# Patient Record
Sex: MALE | Race: WHITE
[De-identification: names, ages, dates, MRNs, and addresses within clinical notes are randomized per-mention and may not be internally consistent; named-entity substitution may affect disease eponyms.]

---

## 2019-07-10 ENCOUNTER — HOSPITAL ENCOUNTER (OUTPATIENT)
Dept: HOSPITAL 39 - GMAM | Age: 74
End: 2019-07-10
Attending: FAMILY MEDICINE
Payer: COMMERCIAL

## 2019-07-10 DIAGNOSIS — J44.9: ICD-10-CM

## 2019-07-10 DIAGNOSIS — I50.32: ICD-10-CM

## 2019-07-10 DIAGNOSIS — R53.83: Primary | ICD-10-CM

## 2019-07-22 ENCOUNTER — HOSPITAL ENCOUNTER (OUTPATIENT)
Dept: HOSPITAL 39 - GRHH | Age: 74
End: 2019-07-22
Attending: FAMILY MEDICINE
Payer: COMMERCIAL

## 2019-07-22 DIAGNOSIS — I25.10: ICD-10-CM

## 2019-07-22 DIAGNOSIS — E11.65: ICD-10-CM

## 2019-07-22 DIAGNOSIS — I50.21: Primary | ICD-10-CM

## 2019-07-29 ENCOUNTER — HOSPITAL ENCOUNTER (INPATIENT)
Dept: HOSPITAL 39 - ER | Age: 74
LOS: 2 days | Discharge: HOME | DRG: 291 | End: 2019-07-31
Attending: NURSE PRACTITIONER | Admitting: NURSE PRACTITIONER
Payer: COMMERCIAL

## 2019-07-29 DIAGNOSIS — Z95.1: ICD-10-CM

## 2019-07-29 DIAGNOSIS — I27.20: ICD-10-CM

## 2019-07-29 DIAGNOSIS — J44.0: ICD-10-CM

## 2019-07-29 DIAGNOSIS — N18.3: ICD-10-CM

## 2019-07-29 DIAGNOSIS — I42.9: ICD-10-CM

## 2019-07-29 DIAGNOSIS — L97.909: ICD-10-CM

## 2019-07-29 DIAGNOSIS — I50.9: ICD-10-CM

## 2019-07-29 DIAGNOSIS — Z79.4: ICD-10-CM

## 2019-07-29 DIAGNOSIS — E11.621: ICD-10-CM

## 2019-07-29 DIAGNOSIS — Z79.899: ICD-10-CM

## 2019-07-29 DIAGNOSIS — Z90.49: ICD-10-CM

## 2019-07-29 DIAGNOSIS — K44.9: ICD-10-CM

## 2019-07-29 DIAGNOSIS — I35.0: ICD-10-CM

## 2019-07-29 DIAGNOSIS — N28.9: ICD-10-CM

## 2019-07-29 DIAGNOSIS — I13.0: Primary | ICD-10-CM

## 2019-07-29 DIAGNOSIS — J18.9: ICD-10-CM

## 2019-07-29 DIAGNOSIS — E11.22: ICD-10-CM

## 2019-07-29 DIAGNOSIS — E11.51: ICD-10-CM

## 2019-07-29 NOTE — RAD
EXAM:  XR Chest, 1 View



CLINICAL HISTORY:  74 years old and is Male; cough



TECHNIQUE:  Frontal view of the chest.



COMPARISON:  5/21/2019



FINDINGS:

  Limitations:  None.

  Lungs:  There is vascular congestion and bilateral dependent

airspace consolidation.

  Pleural space:  There are small bilateral pleural effusions.

      No pneumothorax.

  Heart:  Unremarkable.  No cardiomegaly.

  Mediastinum:  Unremarkable.

  Bones/joints:  Unremarkable.

  Tubes, lines and devices:  Cardiac pacing leads unchanged.



IMPRESSION:     

  Abnormalities identified strongly suspicious for CHF. 

Component of basilar pneumonia not excluded.



Electronically signed by:  Antonette Salgado MD  7/29/2019 5:44 PM

CDT Workstation: [id number]

## 2019-07-29 NOTE — HP
SUPERVISING PHYSICIAN:  Javier Colorado MD



CHIEF COMPLAINT:  Shortness of breath.



HISTORY OF PRESENT ILLNESS:  This 74-year-old male patient went to his doctor's 
office today for routine followup and followup on his labs.  The patient has a 
history of valvular heart disease with cardiomyopathy.  He has had an ejection 
fraction of 25% to 30% from echocardiogram that was done in June.  More 
recently, he had a chest x-ray which showed bilateral pleural effusions, right 
greater than left.  He actually had a thoracentesis.  When he went to his 
doctor's office today, he also had some shortness of breath.  He was sent to the
Emergency Room from his physician's office for repeat workup.  Here in the 
Emergency Room, he was noted to have some mild bilateral pleural effusions, but 
nothing to the degree that required the thoracentesis before.  However, he also 
had elevated white count at 12.9 and chemistry which showed elevated BUN and 
creatinine, but this was normal for him as well.  BNP was 2240.  His chest x-ray
also showed some diastolic congestion.  For these reasons, he was referred for 
admission.  At the time of examination, the patient is dyspneic, mildly.  His 
oxygen saturations are okay.  He is in good spirits and has no complaints at 
this time.



PAST MEDICAL HISTORY:  

1.  Aortic stenosis with cardiomegaly, ejection fraction of 25% to 30%.  RVSP is

     51 mmHg for moderate pulmonary hypertension.  Aortic valve shows severe

     stenosis.  

2.  Diabetes mellitus.

3.  Chronic obstructive pulmonary disease.  

4.  Previous GI bleed secondary to esophageal ulcer. 

5.  Hiatal hernia

6.  Chronic renal failure.

7.  Peripheral vascular disease.

8.  Hypertension.



PAST SURGICAL HISTORY:  

1.  Cholecystectomy.

2.  Coronary artery bypass graft.



MEDICATIONS:  

1.  Toujeo 28 units subcutaneously daily.

2.  Toprol 5 mg p.o. daily.

3.  Metoprolol 25 mg p.o. b.i.d.

4.  Zantac 150 mg p.o. daily.

5.  Atorvastatin 40 mg p.o. q.h.s.

6.  Furosemide 20 mg 3 tabs b.i.d.

7.  Protonix 40 mg p.o. b.i.d.

8.  Anoro Ellipta 1 inhalation daily.

9.  Proventil every 4 hours p.r.n.



ALLERGIES:  NO KNOWN DRUG ALLERGIES.



FAMILY HISTORY:  Noncontributory.



REVIEW OF SYSTEMS: 

CONSTITUTIONAL: No fever or chills.  No recent weight loss or weight gain.  

HEENT:  No headaches, vision changes, ear pain, nasal congestion or throat pain.
 

RESPIRATORY:  Positive for shortness of breath.  No cough, hemoptysis or 
pleuritic chest pain.

CARDIOVASCULAR:  No chest pain, palpitations or peripheral edema.

GASTROINTESTINAL:  No nausea, vomiting, diarrhea, constipation or abdominal 
pain.

GENITOURINARY:  No dysuria, frequency or flank pain.

HEMATOLOGIC:  Positive for easy bruising, but no transfusion reaction.  

ENDOCRINE:  No polydipsia, polyuria or polyphagia.  No heat or cold intolerance.

NEUROLOGIC:  No syncope, paresthesias or seizures.  

INTEGUMENTARY:  He does have a left diabetic foot ulcer, but no other rashes or 
lesions.



PHYSICAL EXAMINATION: 



VITAL SIGNS:  Blood pressure 122/76.  Heart rate 75.  Respiratory rate 20.  
Temperature 97.0.  Oxygen saturation 93%.



GENERAL:  Mr. Valerio is a 74-year-old male patient who is in mild respiratory 
distress. 



NEUROLOGIC: The patient is alert and oriented. 



CHEST:  Lungs diminished in the bases, but otherwise clear to auscultation 
bilaterally.



CARDIOVASCULAR:  Regular rate and rhythm with a III/VI systolic murmur noted at 
this time.  



ABDOMEN:  Soft.  Positive bowel sounds.  



EXTREMITIES:  Lower extremities show a left diabetic foot ulcer which is covered
with a dressing.  Pulses 2+.  Capillary refill is less than 2 seconds.  



LABORATORY: Labs and films are as discussed in history of present illness.



ASSESSMENT:

1.  Congestive heart failure exacerbation.

2.  Possible pneumonia.

3.  Acute on chronic kidney disease.

4.  Hypertension.

5.  Cardiomyopathy with ejection fraction 25-30% and moderate pulmonary 
hypertension

     along with valvular heart disease with severe aortic stenosis.

6.  Recent gastrointestinal bleed secondary to esophageal ulcer.  



PLAN:  At this time, the patient will be placed on scheduled diuretics.  We will
need to be cautious with these as they might worsen his renal function.  Given 
his elevated white count and bilateral patchy infiltrates, cannot rule out an 
underlying pneumonia.  I will go ahead and cover for pneumonia at this time.  We
will get serial daily labs to ensure that his BUN and creatinine do not become 
unmanageable.  The patient is supposed to have aortic valve surgery in the 
future, but they are waiting for his diabetic foot ulcer to improve.  I am going
to place him on DVT and GI ulcer prophylaxis as well.  Given his recent gastric 
hemorrhaging, I am going to hold off on Lovenox and utilize early ambulation.  I
will resume his home medications as well.  



#[id number]

[facility]

## 2019-07-29 NOTE — ED.PDOC
History of Present Illness





- General


Chief Complaint: General


Stated Complaint: SOB


Time Seen by Provider: 07/29/19 18:01


Source: patient


Exam Limitations: no limitations





- History of Present Illness


Initial Comments: 





Jatinder Valerio 73 y/o male stated thet he had been having exertional dyspnea for 

the last 3 days but no chest pains.Had history of pleural effusion 6 weeks ago 

and had thoracentesis done by Dr. Behr also scheduled for cardiac work up for 

heart valve surgery by his cardiologist but no definite date yet waiting for 

diabetic left foot ulcer to heal which was followed up by wound care specialist.


Timing/Duration: other - 3 days


Severity: moderate


Improving Factors: rest


Worsening Factors: movement


Associated Symptoms: shortness of breath, other - see hpi


Allergies/Adverse Reactions: 


Allergies





NO KNOWN ALLERGY Allergy (Verified 05/21/19 11:46)


   





Home Medications: 


Ambulatory Orders





Insulin Glargine [Toujeo Solostar] 28 unit SC BEDTIME 05/21/19 


Lisinopril 5 mg PO DAILY 05/21/19 


Metoprolol Tartrate 25 mg PO BID 05/21/19 


raNITIdine HCL [Zantac] 150 mg PO BEDTIME 05/21/19 











Review of Systems





- Review of Systems


Constitutional: States: no symptoms reported


EENTM: States: no symptoms reported


Respiratory: States: see HPI


Cardiology: States: see HPI


Gastrointestinal/Abdominal: States: no symptoms reported


Genitourinary: States: no symptoms reported


Musculoskeletal: States: no symptoms reported


Skin: States: no symptoms reported


Neurological: States: no symptoms reported


Endocrine: States: no symptoms reported


All other Systems: Reviewed and Negative, No Change from Baseline





Past Medical History (General)





- Patient Medical History


Hx Stroke: No


Hx Cardiac Disorders: Yes


Hx Congestive Heart Failure: No


Hx Hypertension: Yes


Hx Diabetes: Yes


Hx Gastroesophageal Reflux: Yes - bleeding


Hx Other PMH: Yes - PAD


Surgical History: cholecystectomy, coronary bypass surgery





- Vaccination History


Hx Influenza Vaccination: No


Hx Pneumococcal Vaccination: Yes





- Social History


Hx Tobacco Use: Yes - 2 ppd


Hx Physical Abuse: No


Hx Emotional Abuse: No





- Activities of Daily Living


Patient Lives Alone: No





Family Medical History





- Family History


  ** Father


Family History: Unknown


Living Status: Unknown


Hx Family Congestive Heart Failure: Yes - multiple family members





Physical Exam





- Physical Exam


General Appearance: Alert, Comfortable, No apparent distress


Eye Exam: bilateral normal


Ears, Nose, Throat: hearing grossly normal


Neck: supple, normal inspection


Respiratory: chest non-tender, no respiratory distress, decreased breath sounds


Cardiovascular/Chest: normal peripheral pulses, regular rate, rhythm, diastolic 

murmur - soft blowing murmur 2nd LICS


Peripheral Pulses: radial,right: 2+, radial,left: 2+, dorsalis pedis,right: 1+, 

dorsalis pedis,left: 1+


Gastrointestinal/Abdominal: soft, no organomegaly


Back Exam: no CVA tenderness, no vertebral tenderness


Extremity: pedal edema - 2+left >right


Neurologic: alert, oriented x 3


Skin Exam: normal color, warm/dry, other - diabetic foot ulcer





Progress





- Progress


Progress: 





07/29/19 19:13


                                        





07/29/19 17:28


B-TYPE NATRIURETIC PEPTIDE/BNP Stat 


CARDIAC ENZYME GROUP Stat 


MAGNESIUM Stat 


PARTIAL THROMBOPLASTIN TIME Stat 


PROTHROMBIN TIME Stat 


URINALYSIS Stat 





07/29/19 Dinner


1800 Calorie ADA Diet 








                         Laboratory Results - last 24 hr











  07/29/19 07/29/19 07/29/19





  16:15 17:28 17:40


 


WBC    12.9 H


 


RBC    4.09 L


 


Hgb    11.5 L


 


Hct    34.7 L


 


MCV    84.8


 


MCH    28.1


 


MCHC    33.1


 


RDW    14.9 H


 


Plt Count    356


 


MPV    9.3


 


Absolute Neuts (auto)    10.30 H


 


Absolute Lymphs (auto)    1.10


 


Absolute Monos (auto)    1.00 H


 


Absolute Eos (auto)    0.30


 


Absolute Basos (auto)    0.10


 


Neutrophils %    80.0 H


 


Lymphocytes %    8.5 L


 


Monocytes %    8.0


 


Eosinophils %    2.7


 


Basophils %    0.8


 


Sodium   Cancelled 


 


Potassium   Cancelled 


 


Chloride   Cancelled 


 


Carbon Dioxide   Cancelled 


 


Anion Gap   Cancelled 


 


BUN   Cancelled 


 


Creatinine   Cancelled 


 


BUN/Creatinine Ratio   Cancelled 


 


POC Glucose  58 L  


 


Random Glucose   Cancelled 


 


Serum Osmolality   Cancelled 


 


Calcium   Cancelled 


 


Total Bilirubin   


 


AST   


 


ALT   


 


Alkaline Phosphatase   


 


Serum Total Protein   


 


Albumin   


 


Globulin   


 


Albumin/Globulin Ratio   














  07/29/19





  17:40


 


WBC 


 


RBC 


 


Hgb 


 


Hct 


 


MCV 


 


MCH 


 


MCHC 


 


RDW 


 


Plt Count 


 


MPV 


 


Absolute Neuts (auto) 


 


Absolute Lymphs (auto) 


 


Absolute Monos (auto) 


 


Absolute Eos (auto) 


 


Absolute Basos (auto) 


 


Neutrophils % 


 


Lymphocytes % 


 


Monocytes % 


 


Eosinophils % 


 


Basophils % 


 


Sodium  134 L


 


Potassium  3.6


 


Chloride  94 L


 


Carbon Dioxide  25


 


Anion Gap  18.6 H


 


BUN  54 H


 


Creatinine  2.23 H


 


BUN/Creatinine Ratio  24.2 H


 


POC Glucose 


 


Random Glucose  123 H


 


Serum Osmolality  284.4


 


Calcium  9.1


 


Total Bilirubin  0.9


 


AST  25


 


ALT  18


 


Alkaline Phosphatase  100


 


Serum Total Protein  9.1 H


 


Albumin  3.9


 


Globulin  5.2 H


 


Albumin/Globulin Ratio  0.8 L











07/29/19 19:13


                               Vital Signs - 8 hr











  07/29/19 07/29/19 07/29/19





  15:55 16:30 18:40


 


Temperature 97.0 F L  


 


Pulse Rate [ 81 84 68





Left Radial]   


 


Respiratory 20 18 18





Rate   


 


Blood Pressure 99/61 96/55 126/79





[Left Arm]   


 


O2 Sat by Pulse 92 L 94 L 91 L





Oximetry   














Departure





- Departure


Clinical Impression: 


 Valvular heart disease, Diabetes 1.5, managed as type 1, CKD (chronic kidney 

disease) stage 3, GFR 30-59 ml/min, Foot ulcer due to secondary DM





Acute exacerbation of CHF (congestive heart failure)


Qualifiers:


 Heart failure type: unspecified Qualified Code(s): I50.9 - Heart failure, 

unspecified





Time of Disposition: 19:55


Disposition: Admit Patient


Condition: Fair


Departure Forms:  Patient Portal Self Enrollment


Referrals: 


Javier Colorado MD [Primary Care Provider] - 1-2 Weeks


Home Medications: 


Ambulatory Orders





Insulin Glargine [Toujeo Solostar] 28 unit SC BEDTIME 05/21/19 


Lisinopril 5 mg PO DAILY 05/21/19 


Metoprolol Tartrate 25 mg PO BID 05/21/19 


raNITIdine HCL [Zantac] 150 mg PO BEDTIME 05/21/19 











Decision To Admit





- Decistion To Admit


Decision to Admit Reason: Admit from ER


Decision to Admit Date: 07/29/19 - D/W Zeyad García-ANP/Hospitalist


Decision to Admit Time: 19:53

## 2019-07-30 RX ADMIN — IPRATROPIUM BROMIDE AND ALBUTEROL SULFATE SCH ML: .5; 3 SOLUTION RESPIRATORY (INHALATION) at 17:37

## 2019-07-30 RX ADMIN — SODIUM CHLORIDE SCH MG: 9 INJECTION, SOLUTION INTRAVENOUS at 08:54

## 2019-07-30 RX ADMIN — ASPIRIN SCH MG: 81 TABLET, COATED ORAL at 08:55

## 2019-07-30 RX ADMIN — FERROUS GLUCONATE TAB 324 MG (37.5 MG ELEMENTAL IRON) SCH MG: 324 (37.5 FE) TAB at 09:01

## 2019-07-30 RX ADMIN — INSULIN LISPRO SCH UNITS: 100 INJECTION, SOLUTION INTRAVENOUS; SUBCUTANEOUS at 07:20

## 2019-07-30 RX ADMIN — PANTOPRAZOLE SODIUM SCH MG: 40 TABLET, DELAYED RELEASE ORAL at 09:48

## 2019-07-30 RX ADMIN — IPRATROPIUM BROMIDE AND ALBUTEROL SULFATE SCH ML: .5; 3 SOLUTION RESPIRATORY (INHALATION) at 08:35

## 2019-07-30 RX ADMIN — INSULIN LISPRO SCH UNITS: 100 INJECTION, SOLUTION INTRAVENOUS; SUBCUTANEOUS at 11:54

## 2019-07-30 RX ADMIN — PANTOPRAZOLE SODIUM SCH MG: 40 TABLET, DELAYED RELEASE ORAL at 17:06

## 2019-07-30 RX ADMIN — IPRATROPIUM BROMIDE AND ALBUTEROL SULFATE SCH ML: .5; 3 SOLUTION RESPIRATORY (INHALATION) at 19:36

## 2019-07-30 RX ADMIN — INSULIN LISPRO SCH UNITS: 100 INJECTION, SOLUTION INTRAVENOUS; SUBCUTANEOUS at 07:21

## 2019-07-30 RX ADMIN — INSULIN LISPRO SCH UNITS: 100 INJECTION, SOLUTION INTRAVENOUS; SUBCUTANEOUS at 11:52

## 2019-07-30 RX ADMIN — INSULIN LISPRO SCH UNITS: 100 INJECTION, SOLUTION INTRAVENOUS; SUBCUTANEOUS at 17:05

## 2019-07-30 RX ADMIN — SODIUM CHLORIDE SCH MG: 9 INJECTION, SOLUTION INTRAVENOUS at 17:06

## 2019-07-30 RX ADMIN — INSULIN LISPRO SCH UNITS: 100 INJECTION, SOLUTION INTRAVENOUS; SUBCUTANEOUS at 21:03

## 2019-07-30 RX ADMIN — IPRATROPIUM BROMIDE AND ALBUTEROL SULFATE SCH ML: .5; 3 SOLUTION RESPIRATORY (INHALATION) at 13:00

## 2019-07-30 NOTE — RAD
EXAM:

  XR Chest, 1 View



CLINICAL HISTORY:

  The patient is 74 years old and is Male; CHF



TECHNIQUE:

  Frontal view of the chest.



COMPARISON:

  Chest radiograph from 07/29/2019



FINDINGS:

  LUNGS:  Persistent mild bibasilar opacities, right more than

left, suggesting atelectasis and/or pneumonia. This is not

significantly changed.

  PLEURAL SPACE:  Probable small bilateral pleural effusions

again noted.  No obvious pneumothorax.  

  HEART:  Stable mild enlargement of the cardiac silhouette.

  MEDIASTINUM:  Unremarkable.

  BONES/JOINTS:  The bones are unchanged.



IMPRESSION:     

  No significant interval change visualized. Bilateral lower lung

opacities again demonstrated, more prominent on the right.

Findings suggest pleural effusions with underlying atelectasis

and/or pneumonia.



Electronically signed by:  Mere Rawls MD  7/30/2019 7:26 AM CDT

Workstation: [id number]

## 2019-07-30 NOTE — PN
SUPERVISING PHYSICIAN:  Javier Colorado MD



DATE:  07/30/19



SUBJECTIVE:  The patient states he feels like he is breathing better than he was
yesterday.  He states he was actually able to sleep last night.  He states he 
has not been able to sleep very well for the past couple of weeks.  



OBJECTIVE:  

VITAL SIGNS:  Blood pressure 120/74.  Heart rate 90.  Respiratory rate 18.  
Temperature 97.8.  Oxygen saturation 96%.

GENERAL:  Mr. Valerio is a 74-year-old male patient in no active distress 
currently.

NEUROLOGIC:  Alert and oriented.

LUNGS:  No rales any longer.  

CARDIOVASCULAR:  Regular rate and rhythm.  Normal S1, S2.  He does have a 
systolic murmur, which is III/VI.

ABDOMEN:  Soft.  Positive bowel sounds.  

GENITOURINARY:  Deferred.

EXTREMITIES: Lower extremities with no significant edema.  



LABORATORY:  White count had normalized at 8.4, hemoglobin 10.1.  Chemistry with
improvement in BUN and creatinine to 53 and 2.05.  



RADIOLOGY:  Chest x-ray done does not show any significant change.



ASSESSMENT: 

1.  Congestive heart failure exacerbation.

2.  Possible pneumonia.

3.  Acute on chronic kidney disease.

4.  Hypertension.

5.  Cardiomyopathy with ejection fraction 25-30% with moderate pulmonary 
hypertension

     along with severe aortic stenosis.

6.  History of gastrointestinal bleed secondary to esophageal ulcer.  



PLAN:  The patient has had symptomatic improvement with IV Lasix.  We will 
continue this for the rest of today and evaluate for discharge first thing in 
the morning.



#[id number]

MTDD

## 2019-07-31 VITALS — SYSTOLIC BLOOD PRESSURE: 106 MMHG | DIASTOLIC BLOOD PRESSURE: 62 MMHG | TEMPERATURE: 98 F | OXYGEN SATURATION: 95 %

## 2019-07-31 RX ADMIN — INSULIN LISPRO SCH UNITS: 100 INJECTION, SOLUTION INTRAVENOUS; SUBCUTANEOUS at 11:31

## 2019-07-31 RX ADMIN — SODIUM CHLORIDE SCH MG: 9 INJECTION, SOLUTION INTRAVENOUS at 09:23

## 2019-07-31 RX ADMIN — INSULIN LISPRO SCH: 100 INJECTION, SOLUTION INTRAVENOUS; SUBCUTANEOUS at 07:08

## 2019-07-31 RX ADMIN — IPRATROPIUM BROMIDE AND ALBUTEROL SULFATE PRN ML: .5; 3 SOLUTION RESPIRATORY (INHALATION) at 03:50

## 2019-07-31 RX ADMIN — ASPIRIN SCH MG: 81 TABLET, COATED ORAL at 09:23

## 2019-07-31 RX ADMIN — PANTOPRAZOLE SODIUM SCH MG: 40 TABLET, DELAYED RELEASE ORAL at 06:05

## 2019-07-31 RX ADMIN — IPRATROPIUM BROMIDE AND ALBUTEROL SULFATE PRN ML: .5; 3 SOLUTION RESPIRATORY (INHALATION) at 08:08

## 2019-07-31 RX ADMIN — FERROUS GLUCONATE TAB 324 MG (37.5 MG ELEMENTAL IRON) SCH MG: 324 (37.5 FE) TAB at 09:23

## 2019-07-31 RX ADMIN — INSULIN LISPRO SCH UNITS: 100 INJECTION, SOLUTION INTRAVENOUS; SUBCUTANEOUS at 07:35

## 2019-07-31 RX ADMIN — INSULIN LISPRO SCH UNITS: 100 INJECTION, SOLUTION INTRAVENOUS; SUBCUTANEOUS at 11:30

## 2019-07-31 NOTE — RAD
EXAM DESCRIPTION: 



Chest,1 View



CLINICAL HISTORY: 



Congestive heart failure 



FINDINGS/ IMPRESSION: 



Comparison 7/30/2019.



Cardiomegaly, increased from previous study. Vascular congestion

with perihilar interstitial edema. Bilateral pleural effusions,

moderate on the right and small on the left. Opacity retrocardiac

may be atelectasis or consolidation



Electronically signed by:  Javier Golden MD  7/31/2019 7:17 AM

CDT Workstation: [id number]

## 2019-07-31 NOTE — DS
SUPERVISING PHYSICIAN:  Javier Colorado MD



DISCHARGE DIAGNOSIS: 

1.  Congestive heart failure with exacerbation.

2.  Possible pneumonia.

3.  Acute on chronic kidney disease.

4.  Hypertension.

5.  Cardiomyopathy with ejection fraction 25-30% with moderate pulmonary 
hypertension

     along with severe aortic stenosis.

6.  History of gastrointestinal bleed secondary to an esophageal ulcer.  



HISTORY OF PRESENT ILLNESS:  This is a 74-year-old male patient who was in his 
doctor's office on the day of admission to followup on his labs.  The patient 
has a history of valvular heart disease with cardiomyopathy.  He has had an 
ejection fraction of 25% to 30% from his echocardiogram that was done in June.  
He had a chest x-ray which showed bilateral pleural effusions, right greater 
than left and actually had a thoracentesis.  On followup, he had some shortness 
of breath.  He was sent to the Emergency Room from his physician's office for 
repeat workup.  Again, he has mild bilateral pleural effusions, but nothing to 
the degree that would require thoracentesis.  He did have an elevated white 
count at 12.9 and chemistry which showed elevated BUN and creatinine.  BNP was 
2240.  His chest x-ray also showed some diastolic congestion.  For these 
reasons, he was referred for admission.  On admission, his vital signs showed 
temperature 97, heart rate 81, blood pressure 99/61, respiratory rate 20 O2 
saturation 92% on room air.  



HOSPITAL COURSE:  Over the next 24 to 48 hours, he was given IV diuretics as 
well as judicious fluids.  He was also started on renal dosed Levaquin.  His 
labs stabilized.  The patient is to have aortic valve surgery in the future, but
they are waiting for his diabetic foot ulcer to improve.  He did have some 
shortness of breath during his stay, but an ambulatory study was done for home 
O2 and even after walking on room air, his oxygen saturations only dropped to 
91%.  The patient feels much improved and he will be discharged home today in 
stable condition.  



LABORATORY:  WBC improved to 8,400 and today 12,000.  Hemoglobin and hematocrit 
remained stable at 10.3 and 31.6.  Blood sugars ran between 140 and 289.  His 
potassium initially was 3.6 and today it is 3.5.  Creatinine 1.86 and baseline 
is about 1.9.  Urinalysis was unremarkable.  



RADIOLOGY:  Chest x-ray as per the history of present illness.  Today, his chest
x-ray shows cardiomegaly increased from previous study, vascular congestion with
perihilar interstitial edema, bilateral pleural effusions, moderate on the right
and small on the left.  Opacity retrocardiac may be atelectasis or 
consolidation.  



DISCHARGE PLAN:  The patient will be discharged home in stable condition.  He is
to resume his previous diet as well as increase his activity as tolerated.  He 
will be discharged on 7 additional days of Levaquin as well as some potassium 
and probiotics.  He has a followup appointment with Dr. Colorado on 08/08/19 at 
8:30 AM.  At that time, it is recommended he have a chest x-ray, CBC and CMP.  
His potassium is a new prescription as he is on 120 mg of Lasix daily and on no 
potassium at this time.  He should have completed his antibiotics by the time he
sees Dr. Colorado.  As noted above, he did not qualify for home oxygen and he is 
to followup with Dr. Colorado's office or return to the hospital for any problems 
or complications.



DISCHARGE MEDICATIONS:

1.  Lantus insulin.

2.  Furosemide.

3.  Pantoprazole.

4.  Atorvastatin.

5.  Lispro insulin.

6.  Vitamin B6.

7.  Ferrous gluconate.

8.  Docusate sodium.

9.  Cyanocobalamin.

10. Aspirin.

12. Acetaminophen.

13. Anoro.

14. Albuterol sulfate nebulizers.

15. Align.

16. Levaquin.

17. Potassium chloride.



#[id number]

[facility]

## 2019-09-23 ENCOUNTER — HOSPITAL ENCOUNTER (OUTPATIENT)
Dept: HOSPITAL 39 - GRHH | Age: 74
End: 2019-09-23
Attending: FAMILY MEDICINE
Payer: COMMERCIAL

## 2019-09-23 DIAGNOSIS — I50.21: Primary | ICD-10-CM
